# Patient Record
Sex: FEMALE | Race: OTHER | HISPANIC OR LATINO
[De-identification: names, ages, dates, MRNs, and addresses within clinical notes are randomized per-mention and may not be internally consistent; named-entity substitution may affect disease eponyms.]

---

## 2022-01-28 PROBLEM — Z00.00 ENCOUNTER FOR PREVENTIVE HEALTH EXAMINATION: Status: ACTIVE | Noted: 2022-01-28

## 2022-02-23 ENCOUNTER — RESULT REVIEW (OUTPATIENT)
Age: 47
End: 2022-02-23

## 2022-02-23 ENCOUNTER — APPOINTMENT (OUTPATIENT)
Dept: HEMATOLOGY ONCOLOGY | Facility: CLINIC | Age: 47
End: 2022-02-23
Payer: COMMERCIAL

## 2022-02-23 VITALS
DIASTOLIC BLOOD PRESSURE: 82 MMHG | BODY MASS INDEX: 44.65 KG/M2 | HEART RATE: 83 BPM | SYSTOLIC BLOOD PRESSURE: 130 MMHG | HEIGHT: 65 IN | OXYGEN SATURATION: 97 % | RESPIRATION RATE: 18 BRPM | WEIGHT: 268 LBS | TEMPERATURE: 83 F

## 2022-02-23 DIAGNOSIS — Z82.49 FAMILY HISTORY OF ISCHEMIC HEART DISEASE AND OTHER DISEASES OF THE CIRCULATORY SYSTEM: ICD-10-CM

## 2022-02-23 DIAGNOSIS — E78.00 PURE HYPERCHOLESTEROLEMIA, UNSPECIFIED: ICD-10-CM

## 2022-02-23 DIAGNOSIS — Z86.79 PERSONAL HISTORY OF OTHER DISEASES OF THE CIRCULATORY SYSTEM: ICD-10-CM

## 2022-02-23 DIAGNOSIS — E78.1 PURE HYPERGLYCERIDEMIA: ICD-10-CM

## 2022-02-23 PROCEDURE — 36415 COLL VENOUS BLD VENIPUNCTURE: CPT

## 2022-02-23 PROCEDURE — 99205 OFFICE O/P NEW HI 60 MIN: CPT | Mod: 25

## 2022-02-23 RX ORDER — LEVOTHYROXINE SODIUM 0.05 MG/1
50 TABLET ORAL
Qty: 90 | Refills: 0 | Status: ACTIVE | COMMUNITY
Start: 2021-10-26

## 2022-02-23 RX ORDER — METFORMIN ER 500 MG 500 MG/1
500 TABLET ORAL
Qty: 180 | Refills: 0 | Status: ACTIVE | COMMUNITY
Start: 2021-06-23

## 2022-02-23 RX ORDER — ROSUVASTATIN CALCIUM 5 MG/1
5 TABLET, FILM COATED ORAL
Qty: 30 | Refills: 0 | Status: ACTIVE | COMMUNITY
Start: 2022-02-15

## 2022-02-23 NOTE — HISTORY OF PRESENT ILLNESS
[de-identified] : Ms. Tesfaye is a 46 year old woman who presents for initial consultation as she has a significant family history of DVTs.\par her mother was diagnosed with a PE at age 64- has RA, unexpected, place don eliquis\par Sister (47)  unexpectedly, no medical conditions, autopsy results showed PE\par \par She reports feeling well, but has chronic constipation alternating with diarrhea.\par \par Age of Menarche: 13\par LMP: 2/15/2022, lasts about 5 days, needs to change feminine products every 2 hours at heaviest\par OCP/HRT: denies\par \par \par Currently employed as assistant at [employer]

## 2022-02-23 NOTE — ASSESSMENT
A Lexiscan stress test was completed on this patient as ordered. The patient tolerated the procedure well. Aminophylline 75mg given ivp for lingering sob, chest tightness and headache. Symptoms resolved. Awaiting stress imaging at this time. [FreeTextEntry1] : #hypercoaguable state\par reviewed family history - mom and sister with blood clots, patient is obese - those are her risk factors for clots\par reviewed that she has not had a clot\par will do hypercoag work up to ensure that she does not have a disposition to forming clots\par Given her risk factors as well as her current medical co-morbities would recommend a baby ASA 81 mg daily.\par will review work up and determine if she needs any other medications in the form of prophylactic AC\par \par #DM/HTN/hypertriglyceridemia\par managed per PCP\par she  is trying to lose weight - continue with diet and exercise\par \par RTC in 4 weeks to discuss blood work

## 2022-04-13 ENCOUNTER — APPOINTMENT (OUTPATIENT)
Dept: HEMATOLOGY ONCOLOGY | Facility: CLINIC | Age: 47
End: 2022-04-13
Payer: COMMERCIAL

## 2022-04-15 ENCOUNTER — APPOINTMENT (OUTPATIENT)
Dept: HEMATOLOGY ONCOLOGY | Facility: CLINIC | Age: 47
End: 2022-04-15
Payer: COMMERCIAL

## 2022-04-15 ENCOUNTER — TRANSCRIPTION ENCOUNTER (OUTPATIENT)
Age: 47
End: 2022-04-15

## 2022-04-15 VITALS
SYSTOLIC BLOOD PRESSURE: 132 MMHG | HEIGHT: 65 IN | WEIGHT: 270 LBS | DIASTOLIC BLOOD PRESSURE: 77 MMHG | RESPIRATION RATE: 18 BRPM | OXYGEN SATURATION: 95 % | TEMPERATURE: 97.2 F | BODY MASS INDEX: 44.98 KG/M2 | HEART RATE: 69 BPM

## 2022-04-15 DIAGNOSIS — N92.0 EXCESSIVE AND FREQUENT MENSTRUATION WITH REGULAR CYCLE: ICD-10-CM

## 2022-04-15 DIAGNOSIS — R74.8 ABNORMAL LEVELS OF OTHER SERUM ENZYMES: ICD-10-CM

## 2022-04-15 PROCEDURE — 36415 COLL VENOUS BLD VENIPUNCTURE: CPT

## 2022-04-15 PROCEDURE — 99213 OFFICE O/P EST LOW 20 MIN: CPT | Mod: 25

## 2022-04-15 NOTE — HISTORY OF PRESENT ILLNESS
[de-identified] : Ms. 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

## 2022-04-15 NOTE — ASSESSMENT
[FreeTextEntry1] : #hypercoaguable state\par reviewed family history - mom and sister with blood clots, patient is obese - those are her risk factors for clots\par reviewed that she has not had a clot\par will do hypercoag work up to ensure that she does not have a disposition to forming clots\par Given her risk factors as well as her current medical comorbidities would recommend a baby ASA 81 mg daily.\par will review work up and determine if she needs any other medications in the form of prophylactic AC\par 4/15/2022 blood work reviewed, without predisposition for clot formation, however reviewed that patient should continue baby aspirin 81mg daily, be mindful of lengthy car rides and flights-get up walk around, do calf extensions; try to lose weight\par \par #menorrhagia\par  will monitor irons\par discuss with gyn about non-hormonal options\par \par #DM/HTN/hypertriglyceridemia\par managed per PCP\par she  is trying to lose weight - continue with diet and exercise\par \par Case and management discussed with Dr. Mccormack\par RTC in 6monthsCBC + diff, CMP, iron panel, ESR

## 2022-10-11 DIAGNOSIS — R70.0 ELEVATED ERYTHROCYTE SEDIMENTATION RATE: ICD-10-CM

## 2022-10-11 DIAGNOSIS — D68.59 OTHER PRIMARY THROMBOPHILIA: ICD-10-CM

## 2022-11-11 ENCOUNTER — APPOINTMENT (OUTPATIENT)
Dept: HEMATOLOGY ONCOLOGY | Facility: CLINIC | Age: 47
End: 2022-11-11

## 2023-02-15 ENCOUNTER — APPOINTMENT (OUTPATIENT)
Dept: PULMONOLOGY | Facility: CLINIC | Age: 48
End: 2023-02-15
Payer: COMMERCIAL

## 2023-02-15 VITALS
BODY MASS INDEX: 46.82 KG/M2 | RESPIRATION RATE: 16 BRPM | WEIGHT: 281 LBS | SYSTOLIC BLOOD PRESSURE: 144 MMHG | OXYGEN SATURATION: 98 % | HEART RATE: 89 BPM | DIASTOLIC BLOOD PRESSURE: 100 MMHG | HEIGHT: 65 IN

## 2023-02-15 DIAGNOSIS — E11.9 TYPE 2 DIABETES MELLITUS W/OUT COMPLICATIONS: ICD-10-CM

## 2023-02-15 DIAGNOSIS — E66.01 MORBID (SEVERE) OBESITY DUE TO EXCESS CALORIES: ICD-10-CM

## 2023-02-15 DIAGNOSIS — G47.33 OBSTRUCTIVE SLEEP APNEA (ADULT) (PEDIATRIC): ICD-10-CM

## 2023-02-15 PROCEDURE — 99203 OFFICE O/P NEW LOW 30 MIN: CPT

## 2023-02-15 RX ORDER — DULAGLUTIDE 0.75 MG/.5ML
0.75 INJECTION, SOLUTION SUBCUTANEOUS
Qty: 2 | Refills: 0 | Status: DISCONTINUED | COMMUNITY
Start: 2022-02-15 | End: 2023-02-15

## 2023-02-15 NOTE — HISTORY OF PRESENT ILLNESS
[FreeTextEntry1] : Sabiha Damon\par 47 year old woman  with history of diabetes is here in the sleep center to address sleep apnea.  Patient is not sleepy with [place] sleepiness score of 7.  Patient has very loud snoring which disturbs other people, does not have any witnessed apneas.  Patient's bedtime is around 10 PM wakes up in the morning around 5.30 AM. She feels rested when she wakes up.  Patient drinks 1 cup of coffee during the daytime. Patient does not have any headaches or nocturia.  She is sleepy while driving sometimes.\par STOPBANG score - 5\par

## 2023-02-15 NOTE — ASSESSMENT
[FreeTextEntry1] : 47-year-old female with history of loud snoring , sleepiness while driving and obesity\par \par Will do a sleep study to assess the degree of sleep apnea. If there is significant sleep apnea patient will benefit with cpap therapy.\par \par Obesity : Weight loss will help improve degree of sleep apnea\par \par Diabetes: Patient has an appointment with new endocrinologist next week, she will discuss about switching metformin to ozempic which can help with weight loss as well.

## 2023-10-17 NOTE — REVIEW OF SYSTEMS
[Negative] : Allergic/Immunologic pt went to GYN for an iud placement  she developed severe abd pain, n/v  they removed the IUD and sent her to the ER.  she has a history of vaginal bleeding  pt appears in pain  piv inserted via ultrasound and meds given